# Patient Record
(demographics unavailable — no encounter records)

---

## 2024-10-16 NOTE — HISTORY OF PRESENT ILLNESS
[Patient reported PAP Smear was normal] : Patient reported PAP Smear was normal [Menarche Age: ____] : age at menarche was [unfilled] [Men] : men [N] : Patient does not use contraception [Y] : Patient is sexually active [No] : Patient does not have concerns regarding sex [Currently Active] : currently active [Mammogramdate] : n/a [BreastSonogramDate] : n/a [PapSmeardate] : 04/30/2021 [ColonoscopyDate] : n/a [GonorrheaDate] : 12/23/2021 [TextBox_63] : neg  [ChlamydiaDate] : 12/23/2021 [TextBox_68] : neg  [LMPDate] : 04/27/2024 [PGHxTotal] : 0 [FreeTextEntry1] : 04/27/2024

## 2024-11-04 NOTE — PLAN
[FreeTextEntry1] : Labs today  RTC after bleeding for pap smear Pt advised to consider cyclical provera if no menses a x 3 months

## 2024-11-04 NOTE — HISTORY OF PRESENT ILLNESS
[Condoms] : uses condoms [Y] : Patient is sexually active [N] : Patient denies prior pregnancies [Menarche Age: ____] : age at menarche was [unfilled] [No] : Patient does not have concerns regarding sex [Currently Active] : currently active [Men] : men [PapSmeardate] : 04/30/21 [TextBox_31] : NEG [GonorrheaDate] : 12/23/21 [TextBox_63] : NEG [ChlamydiaDate] : 12/23/21 [TextBox_68] : NEG [LMPDate] : 10/29/24 [PGHxTotal] : 0 [FreeTextEntry1] : 10/29/24

## 2024-11-07 NOTE — PHYSICAL EXAM
[Chaperone Present] : A chaperone was present in the examining room during all aspects of the physical examination [[id number]] : A chaperone was present during the pelvic exam. [Appropriately responsive] : appropriately responsive [Alert] : alert [No Acute Distress] : no acute distress [Soft] : soft [Non-tender] : non-tender [Non-distended] : non-distended [No Lesions] : no lesions [No Mass] : no mass [Oriented x3] : oriented x3 [Examination Of The Breasts] : a normal appearance [No Masses] : no breast masses were palpable [Labia Majora] : normal [Labia Minora] : normal [Normal] : normal [Uterine Adnexae] : normal [FreeTextEntry2] : So

## 2024-11-07 NOTE — HISTORY OF PRESENT ILLNESS
[FreeTextEntry1] : She is G0, reports long hx of irregular periods, and having hx of PCOS and hx of bicornuate uterus. She is here for pap smear/annual today She is on tripetal for her hx of epilepsy- d/w pt that she can still have combined OCP for regulating her cycles, and to use back up (condoms) as certain anti epilepsy medication may decrease the effectiveness of the anti-contraception protection  She started the Provera challenge last week, and reports started bleeding

## 2024-11-07 NOTE — HISTORY OF PRESENT ILLNESS
[Y] : Patient is sexually active [N] : Patient denies prior pregnancies [PapSmeardate] : 4/30/2021 [TextBox_31] : neg [GonorrheaDate] : 12/23/2021 [TextBox_63] : neg [ChlamydiaDate] : 12/23/2021 [TextBox_68] : neg [LMPDate] : 10/29/2024 [PGHxTotal] : 0 [Menarche Age: ____] : age at menarche was [unfilled] [Yes] : Patient has concerns regarding sex [Currently Active] : currently active [Men] : men [FreeTextEntry1] : Painful during intercourse

## 2024-11-07 NOTE — REASON FOR VISIT
[Follow-Up] : a follow-up evaluation of [FreeTextEntry2] : Repeat Pap (Pt: back pain, pelvic pain, nausea, diarrhea, and painful during intercourse.)

## 2024-11-07 NOTE — PLAN
[FreeTextEntry1] : Pap smear vaginitis panel Start OCP- proper usage d/w pt RTC in 6 months for follow up    She would like to try combined OCP. We reviewed correct, daily administration of pills, and discussed the need to take a home pregnancy test if she ever feels any symptoms of pregnancy, since the pill is not 100% effective. Discussed potential common side effects, including irregular bleeding/ spotting. We also reviewed concerning signs and symptoms while using JANETT that warrant emergent evaluation, such as severe abdominal pain, chest pain, shortness of breath, severe headache, sensory changes, pain/swelling/redness in one or both legs, as these could indicate a serious, potentially life-threatening condition.